# Patient Record
(demographics unavailable — no encounter records)

---

## 2025-07-17 NOTE — HISTORY OF PRESENT ILLNESS
[FreeTextEntry8] : Patient presented today to establish care with a new provider.  Patient used to see pediatrics prior to this visit.  Her last blood work was done last year.  No specific medical or surgical history.  Not on any medications.  Patient is sexually active but is not on any birth control.  I discussed with the patient in detail regarding birth control.  Also advised her to discuss it with her partner and gynecologist No smoking No drinking